# Patient Record
Sex: MALE | HISPANIC OR LATINO | URBAN - METROPOLITAN AREA
[De-identification: names, ages, dates, MRNs, and addresses within clinical notes are randomized per-mention and may not be internally consistent; named-entity substitution may affect disease eponyms.]

---
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
## 2023-01-03 ENCOUNTER — OFFICE VISIT (OUTPATIENT)
Dept: PLASTIC SURGERY | Facility: CLINIC | Age: 18
End: 2023-01-03
Payer: COMMERCIAL

## 2023-01-03 VITALS
BODY MASS INDEX: 25.66 KG/M2 | OXYGEN SATURATION: 99 % | WEIGHT: 154 LBS | HEIGHT: 65 IN | SYSTOLIC BLOOD PRESSURE: 139 MMHG | HEART RATE: 82 BPM | DIASTOLIC BLOOD PRESSURE: 84 MMHG

## 2023-01-03 DIAGNOSIS — F64.0 GENDER DYSPHORIA IN ADULT: Primary | ICD-10-CM

## 2023-01-03 PROCEDURE — 99212 OFFICE O/P EST SF 10 MIN: CPT | Performed by: SURGERY

## 2023-01-03 ASSESSMENT — PAIN SCALES - GENERAL: PAINLEVEL: MODERATE PAIN (4)

## 2023-01-03 NOTE — LETTER
1/3/2023       RE: Virgil Calle  [address] [place] [address]     Dear Colleague,    Thank you for referring your patient, Virgil Calle, to the [facility] PLASTIC AND RECONSTRUCTIVE SURGERY CLINIC [place] at [facility]. Please see a copy of my visit note below.    PLASTICS PRE-OP  This is a 17 year old trans male who presents for his pre-op visit prior to bilateral simple mastectomy with nipple graft reconstruction scheduled for 1/6/2023. He is here today by himself. The patient's letter of support from Chet López has been received. History and physical will be completed today. COVID test will be performed 24-48 hours prior to surgery.     His roommate Mere will be able to take care of him postoperatively. Virgil denies having questions about the procedure.    Our LPN, Nida, discussed periop instructions with the patient including: not eating anything 8 hours prior to surgery, drinking clear liquids up to 2 hours before surgery except for morning medications with a sip of water, the preop shower with surgical soap which was given, and wearing a button- or zip-up shirt on the day of surgery. The patient was also instructed to avoid NSAIDs x 1 week both before AND after surgery, but he may take Tylenol post-op for pain as needed. The patient was provided with a folder of information on antoine-op topics, including where the surgery will be.     I discussed the following with the patient; preop, intraop and postop phases of care on the day of surgery, the placement of a bladder catheter during surgery that will likely be removed in recovery, postop cares and limitations with relation to home and work settings, 5-lb weight restriction for the first 3 weeks postop, and how long to maintain limited activities. We also discussed Zofran, oxycodone, Z-hermelinda, and antipruritics which will be prescribed. We discussed that preventing constipation will be their  responsibility, and we discussed methods such as aloe, prune juice or Miralax.     In addition, we went over the possible risks and complications involved with this elective procedure. These include but are not limited to: infection, bleeding, hematoma/seroma formation, and poor healing (including dehiscence, nipple graft loss, or hypertrophic scarring). We also discussed the possibility of altered chest sensation (either hypo or hypersensitive), residual deformities and asymmetries, possible further surgical revisions, and possible injury to surrounding neurovascular and musculoskeletal structures, including intra-axillary or intra-thoracic. We lastly discussed anesthetic risks including DVT/PE or cardiopulmonary events.      All questions were answered. Virgil will bring any other questions that arise to the day of surgery.    Total time = 10 minutes, spent on the date of encounter doing chart review, history and physical, dressing changes, documentation, patient education, and any further activity as noted above.     This note was prepared on behalf of Jeannie Kelly MD by Mere Hirsch (they/them, a trained medical scribe, based on my observations and the provider's statements to me.         Again, thank you for allowing me to participate in the care of your patient.      Sincerely,    Jeannie Kelly MD

## 2023-01-03 NOTE — NURSING NOTE
"Chief Complaint   Patient presents with     RECHECK     Virgil, is being seen today for a pre-op DOS 1/6/23.       Vitals:    01/03/23 0832   BP: 139/84   BP Location: Left arm   Patient Position: Chair   Cuff Size: Adult Regular   Pulse: 82   SpO2: 99%   Weight: 69.9 kg (154 lb)   Height: 1.651 m (5' 5\")       Body mass index is 25.63 kg/m .      Nida Carnes LPN    "

## 2023-01-03 NOTE — PROGRESS NOTES
PLASTICS PRE-OP  This is a 17 year old trans male who presents for his pre-op visit prior to bilateral simple mastectomy with nipple graft reconstruction scheduled for 1/6/2023. He is here today by himself. The patient's letter of support from Chet López has been received. History and physical will be completed today. COVID test will be performed 24-48 hours prior to surgery.     His roommate Mere will be able to take care of him postoperatively. Virgil denies having questions about the procedure.    Our LPN, Nida, discussed periop instructions with the patient including: not eating anything 8 hours prior to surgery, drinking clear liquids up to 2 hours before surgery except for morning medications with a sip of water, the preop shower with surgical soap which was given, and wearing a button- or zip-up shirt on the day of surgery. The patient was also instructed to avoid NSAIDs x 1 week both before AND after surgery, but he may take Tylenol post-op for pain as needed. The patient was provided with a folder of information on antoine-op topics, including where the surgery will be.     I discussed the following with the patient; preop, intraop and postop phases of care on the day of surgery, the placement of a bladder catheter during surgery that will likely be removed in recovery, postop cares and limitations with relation to home and work settings, 5-lb weight restriction for the first 3 weeks postop, and how long to maintain limited activities. We also discussed Zofran, oxycodone, Z-hermelinda, and antipruritics which will be prescribed. We discussed that preventing constipation will be their responsibility, and we discussed methods such as aloe, prune juice or Miralax.     In addition, we went over the possible risks and complications involved with this elective procedure. These include but are not limited to: infection, bleeding, hematoma/seroma formation, and poor healing (including dehiscence, nipple graft loss, or  hypertrophic scarring). We also discussed the possibility of altered chest sensation (either hypo or hypersensitive), residual deformities and asymmetries, possible further surgical revisions, and possible injury to surrounding neurovascular and musculoskeletal structures, including intra-axillary or intra-thoracic. We lastly discussed anesthetic risks including DVT/PE or cardiopulmonary events.      All questions were answered. Virgil will bring any other questions that arise to the day of surgery.    Total time = 10 minutes, spent on the date of encounter doing chart review, history and physical, dressing changes, documentation, patient education, and any further activity as noted above.     This note was prepared on behalf of Jeannie Kelly MD by Mere Hirsch (they/them, a trained medical scribe, based on my observations and the provider's statements to me.

## 2023-01-05 ENCOUNTER — ANESTHESIA EVENT (OUTPATIENT)
Dept: SURGERY | Facility: AMBULATORY SURGERY CENTER | Age: 18
End: 2023-01-05
Payer: COMMERCIAL

## 2023-01-06 ENCOUNTER — HOSPITAL ENCOUNTER (OUTPATIENT)
Facility: AMBULATORY SURGERY CENTER | Age: 18
Discharge: HOME | End: 2023-01-06
Attending: SURGERY
Payer: COMMERCIAL

## 2023-01-06 ENCOUNTER — ANESTHESIA (OUTPATIENT)
Dept: SURGERY | Facility: AMBULATORY SURGERY CENTER | Age: 18
End: 2023-01-06
Payer: COMMERCIAL

## 2023-01-06 VITALS
RESPIRATION RATE: 14 BRPM | OXYGEN SATURATION: 94 % | HEIGHT: 65 IN | DIASTOLIC BLOOD PRESSURE: 56 MMHG | SYSTOLIC BLOOD PRESSURE: 102 MMHG | HEART RATE: 67 BPM | WEIGHT: 154 LBS | TEMPERATURE: 97.5 F | BODY MASS INDEX: 25.66 KG/M2

## 2023-01-06 DIAGNOSIS — G89.18 POST-OPERATIVE PAIN: Primary | ICD-10-CM

## 2023-01-06 LAB
HCG UR QL: NEGATIVE
INTERNAL QC OK POCT: NORMAL
POCT KIT EXPIRATION DATE: NORMAL
POCT KIT LOT NUMBER: NORMAL

## 2023-01-06 PROCEDURE — 19350 NIPPLE/AREOLA RECONSTRUCTION: CPT | Mod: 50 | Performed by: SURGERY

## 2023-01-06 PROCEDURE — 19303 MAST SIMPLE COMPLETE: CPT | Mod: 50 | Performed by: SURGERY

## 2023-01-06 PROCEDURE — 81025 URINE PREGNANCY TEST: CPT | Performed by: PATHOLOGY

## 2023-01-06 PROCEDURE — 88305 TISSUE EXAM BY PATHOLOGIST: CPT | Mod: 26 | Performed by: PATHOLOGY

## 2023-01-06 PROCEDURE — 88305 TISSUE EXAM BY PATHOLOGIST: CPT | Mod: TC | Performed by: SURGERY

## 2023-01-06 RX ORDER — OXYCODONE HYDROCHLORIDE 5 MG/1
10 TABLET ORAL EVERY 4 HOURS PRN
Status: DISCONTINUED | OUTPATIENT
Start: 2023-01-06 | End: 2023-01-07 | Stop reason: HOSPADM

## 2023-01-06 RX ORDER — ONDANSETRON 2 MG/ML
INJECTION INTRAMUSCULAR; INTRAVENOUS PRN
Status: DISCONTINUED | OUTPATIENT
Start: 2023-01-06 | End: 2023-01-06

## 2023-01-06 RX ORDER — LIDOCAINE HYDROCHLORIDE 20 MG/ML
INJECTION, SOLUTION INFILTRATION; PERINEURAL PRN
Status: DISCONTINUED | OUTPATIENT
Start: 2023-01-06 | End: 2023-01-06

## 2023-01-06 RX ORDER — FENTANYL CITRATE 50 UG/ML
50 INJECTION, SOLUTION INTRAMUSCULAR; INTRAVENOUS EVERY 5 MIN PRN
Status: DISCONTINUED | OUTPATIENT
Start: 2023-01-06 | End: 2023-01-07 | Stop reason: HOSPADM

## 2023-01-06 RX ORDER — ONDANSETRON 2 MG/ML
4 INJECTION INTRAMUSCULAR; INTRAVENOUS EVERY 30 MIN PRN
Status: DISCONTINUED | OUTPATIENT
Start: 2023-01-06 | End: 2023-01-07 | Stop reason: HOSPADM

## 2023-01-06 RX ORDER — OXYCODONE HYDROCHLORIDE 5 MG/1
5 TABLET ORAL EVERY 6 HOURS PRN
Qty: 12 TABLET | Refills: 0 | Status: SHIPPED | OUTPATIENT
Start: 2023-01-06 | End: 2023-01-10

## 2023-01-06 RX ORDER — DEXMEDETOMIDINE HYDROCHLORIDE 4 UG/ML
INJECTION, SOLUTION INTRAVENOUS CONTINUOUS PRN
Status: DISCONTINUED | OUTPATIENT
Start: 2023-01-06 | End: 2023-01-06

## 2023-01-06 RX ORDER — FENTANYL CITRATE 50 UG/ML
25 INJECTION, SOLUTION INTRAMUSCULAR; INTRAVENOUS
Status: DISCONTINUED | OUTPATIENT
Start: 2023-01-06 | End: 2023-01-07 | Stop reason: HOSPADM

## 2023-01-06 RX ORDER — OXYCODONE HYDROCHLORIDE 5 MG/1
5 TABLET ORAL EVERY 4 HOURS PRN
Status: DISCONTINUED | OUTPATIENT
Start: 2023-01-06 | End: 2023-01-07 | Stop reason: HOSPADM

## 2023-01-06 RX ORDER — MEPERIDINE HYDROCHLORIDE 25 MG/ML
12.5 INJECTION INTRAMUSCULAR; INTRAVENOUS; SUBCUTANEOUS
Status: DISCONTINUED | OUTPATIENT
Start: 2023-01-06 | End: 2023-01-07 | Stop reason: HOSPADM

## 2023-01-06 RX ORDER — HYDROMORPHONE HYDROCHLORIDE 1 MG/ML
0.2 INJECTION, SOLUTION INTRAMUSCULAR; INTRAVENOUS; SUBCUTANEOUS EVERY 5 MIN PRN
Status: DISCONTINUED | OUTPATIENT
Start: 2023-01-06 | End: 2023-01-07 | Stop reason: HOSPADM

## 2023-01-06 RX ORDER — PROPOFOL 10 MG/ML
INJECTION, EMULSION INTRAVENOUS PRN
Status: DISCONTINUED | OUTPATIENT
Start: 2023-01-06 | End: 2023-01-06

## 2023-01-06 RX ORDER — LIDOCAINE 40 MG/G
CREAM TOPICAL
Status: DISCONTINUED | OUTPATIENT
Start: 2023-01-06 | End: 2023-01-07 | Stop reason: HOSPADM

## 2023-01-06 RX ORDER — EPHEDRINE SULFATE 50 MG/ML
INJECTION, SOLUTION INTRAMUSCULAR; INTRAVENOUS; SUBCUTANEOUS PRN
Status: DISCONTINUED | OUTPATIENT
Start: 2023-01-06 | End: 2023-01-06

## 2023-01-06 RX ORDER — PROPOFOL 10 MG/ML
INJECTION, EMULSION INTRAVENOUS CONTINUOUS PRN
Status: DISCONTINUED | OUTPATIENT
Start: 2023-01-06 | End: 2023-01-06

## 2023-01-06 RX ORDER — ONDANSETRON 4 MG/1
4 TABLET, ORALLY DISINTEGRATING ORAL EVERY 30 MIN PRN
Status: DISCONTINUED | OUTPATIENT
Start: 2023-01-06 | End: 2023-01-07 | Stop reason: HOSPADM

## 2023-01-06 RX ORDER — FENTANYL CITRATE 50 UG/ML
INJECTION, SOLUTION INTRAMUSCULAR; INTRAVENOUS PRN
Status: DISCONTINUED | OUTPATIENT
Start: 2023-01-06 | End: 2023-01-06

## 2023-01-06 RX ORDER — CEFAZOLIN SODIUM 2 G/50ML
2 SOLUTION INTRAVENOUS SEE ADMIN INSTRUCTIONS
Status: DISCONTINUED | OUTPATIENT
Start: 2023-01-06 | End: 2023-01-07 | Stop reason: HOSPADM

## 2023-01-06 RX ORDER — HYDROMORPHONE HYDROCHLORIDE 1 MG/ML
0.4 INJECTION, SOLUTION INTRAMUSCULAR; INTRAVENOUS; SUBCUTANEOUS EVERY 5 MIN PRN
Status: DISCONTINUED | OUTPATIENT
Start: 2023-01-06 | End: 2023-01-07 | Stop reason: HOSPADM

## 2023-01-06 RX ORDER — KETOROLAC TROMETHAMINE 30 MG/ML
INJECTION, SOLUTION INTRAMUSCULAR; INTRAVENOUS PRN
Status: DISCONTINUED | OUTPATIENT
Start: 2023-01-06 | End: 2023-01-06

## 2023-01-06 RX ORDER — SODIUM CHLORIDE, SODIUM LACTATE, POTASSIUM CHLORIDE, CALCIUM CHLORIDE 600; 310; 30; 20 MG/100ML; MG/100ML; MG/100ML; MG/100ML
INJECTION, SOLUTION INTRAVENOUS CONTINUOUS
Status: DISCONTINUED | OUTPATIENT
Start: 2023-01-06 | End: 2023-01-07 | Stop reason: HOSPADM

## 2023-01-06 RX ORDER — ONDANSETRON 4 MG/1
4 TABLET, FILM COATED ORAL EVERY 8 HOURS PRN
Qty: 12 TABLET | Refills: 0 | Status: SHIPPED | OUTPATIENT
Start: 2023-01-06

## 2023-01-06 RX ORDER — HYDROXYZINE HYDROCHLORIDE 25 MG/1
25 TABLET, FILM COATED ORAL 3 TIMES DAILY PRN
Qty: 12 TABLET | Refills: 0 | Status: SHIPPED | OUTPATIENT
Start: 2023-01-06 | End: 2023-01-16

## 2023-01-06 RX ORDER — SCOLOPAMINE TRANSDERMAL SYSTEM 1 MG/1
1 PATCH, EXTENDED RELEASE TRANSDERMAL ONCE
Status: DISCONTINUED | OUTPATIENT
Start: 2023-01-06 | End: 2023-01-07 | Stop reason: HOSPADM

## 2023-01-06 RX ORDER — ACETAMINOPHEN 325 MG/1
975 TABLET ORAL ONCE
Status: COMPLETED | OUTPATIENT
Start: 2023-01-06 | End: 2023-01-06

## 2023-01-06 RX ORDER — FENTANYL CITRATE 50 UG/ML
25 INJECTION, SOLUTION INTRAMUSCULAR; INTRAVENOUS EVERY 5 MIN PRN
Status: DISCONTINUED | OUTPATIENT
Start: 2023-01-06 | End: 2023-01-07 | Stop reason: HOSPADM

## 2023-01-06 RX ORDER — AZITHROMYCIN 250 MG/1
TABLET, FILM COATED ORAL
Qty: 6 TABLET | Refills: 0 | Status: SHIPPED | OUTPATIENT
Start: 2023-01-06 | End: 2023-01-11

## 2023-01-06 RX ORDER — BUPIVACAINE HYDROCHLORIDE 2.5 MG/ML
INJECTION, SOLUTION INFILTRATION; PERINEURAL PRN
Status: DISCONTINUED | OUTPATIENT
Start: 2023-01-06 | End: 2023-01-06 | Stop reason: HOSPADM

## 2023-01-06 RX ORDER — CEFAZOLIN SODIUM 2 G/50ML
2 SOLUTION INTRAVENOUS
Status: COMPLETED | OUTPATIENT
Start: 2023-01-06 | End: 2023-01-06

## 2023-01-06 RX ORDER — DEXAMETHASONE SODIUM PHOSPHATE 4 MG/ML
INJECTION, SOLUTION INTRA-ARTICULAR; INTRALESIONAL; INTRAMUSCULAR; INTRAVENOUS; SOFT TISSUE PRN
Status: DISCONTINUED | OUTPATIENT
Start: 2023-01-06 | End: 2023-01-06

## 2023-01-06 RX ADMIN — Medication 0.5 MG: at 12:27

## 2023-01-06 RX ADMIN — SCOLOPAMINE TRANSDERMAL SYSTEM 1 PATCH: 1 PATCH, EXTENDED RELEASE TRANSDERMAL at 17:37

## 2023-01-06 RX ADMIN — DEXMEDETOMIDINE HYDROCHLORIDE 0.5 MCG/KG/HR: 4 INJECTION, SOLUTION INTRAVENOUS at 11:41

## 2023-01-06 RX ADMIN — EPHEDRINE SULFATE 5 MG: 50 INJECTION, SOLUTION INTRAMUSCULAR; INTRAVENOUS; SUBCUTANEOUS at 13:05

## 2023-01-06 RX ADMIN — CEFAZOLIN SODIUM 2 G: 2 SOLUTION INTRAVENOUS at 11:47

## 2023-01-06 RX ADMIN — KETOROLAC TROMETHAMINE 30 MG: 30 INJECTION, SOLUTION INTRAMUSCULAR; INTRAVENOUS at 15:35

## 2023-01-06 RX ADMIN — SODIUM CHLORIDE, SODIUM LACTATE, POTASSIUM CHLORIDE, CALCIUM CHLORIDE: 600; 310; 30; 20 INJECTION, SOLUTION INTRAVENOUS at 11:37

## 2023-01-06 RX ADMIN — FENTANYL CITRATE 50 MCG: 50 INJECTION, SOLUTION INTRAMUSCULAR; INTRAVENOUS at 11:45

## 2023-01-06 RX ADMIN — SODIUM CHLORIDE, SODIUM LACTATE, POTASSIUM CHLORIDE, CALCIUM CHLORIDE: 600; 310; 30; 20 INJECTION, SOLUTION INTRAVENOUS at 13:36

## 2023-01-06 RX ADMIN — EPHEDRINE SULFATE 5 MG: 50 INJECTION, SOLUTION INTRAMUSCULAR; INTRAVENOUS; SUBCUTANEOUS at 12:56

## 2023-01-06 RX ADMIN — FENTANYL CITRATE 25 MCG: 50 INJECTION, SOLUTION INTRAMUSCULAR; INTRAVENOUS at 16:24

## 2023-01-06 RX ADMIN — FENTANYL CITRATE 25 MCG: 50 INJECTION, SOLUTION INTRAMUSCULAR; INTRAVENOUS at 16:17

## 2023-01-06 RX ADMIN — PROPOFOL 200 MG: 10 INJECTION, EMULSION INTRAVENOUS at 11:42

## 2023-01-06 RX ADMIN — DEXAMETHASONE SODIUM PHOSPHATE 4 MG: 4 INJECTION, SOLUTION INTRA-ARTICULAR; INTRALESIONAL; INTRAMUSCULAR; INTRAVENOUS; SOFT TISSUE at 12:12

## 2023-01-06 RX ADMIN — LIDOCAINE HYDROCHLORIDE 100 MG: 20 INJECTION, SOLUTION INFILTRATION; PERINEURAL at 11:41

## 2023-01-06 RX ADMIN — ONDANSETRON 4 MG: 2 INJECTION INTRAMUSCULAR; INTRAVENOUS at 15:35

## 2023-01-06 RX ADMIN — ACETAMINOPHEN 975 MG: 325 TABLET ORAL at 11:02

## 2023-01-06 RX ADMIN — PROPOFOL 200 MCG/KG/MIN: 10 INJECTION, EMULSION INTRAVENOUS at 11:41

## 2023-01-06 RX ADMIN — OXYCODONE HYDROCHLORIDE 5 MG: 5 TABLET ORAL at 16:19

## 2023-01-06 NOTE — ANESTHESIA PREPROCEDURE EVALUATION
Anesthesia Pre-Procedure Evaluation    Patient: Virgil Calle   MRN: [id number] : 2005        Procedure : Procedure(s):  MASTECTOMY, BILATERAL, SIMPLE, possible nipple grafts. OnQ          Past Medical History:   Diagnosis Date     Anxiety      Autism      Depression      Fibromyalgia      Gender dysphoria      OCD (obsessive compulsive disorder)      PTSD (post-traumatic stress disorder)       History reviewed. No pertinent surgical history.   Allergies   Allergen Reactions     Casein Anaphylaxis     Lac Bovis Anaphylaxis     Milk Protein Extract Anaphylaxis     Sertraline Other (See Comments)     Increased anxiety and nausea  Increased anxiety and nausea  Increased anxiety and nausea        Social History     Tobacco Use     Smoking status: Never     Smokeless tobacco: Never   Substance Use Topics     Alcohol use: Never      Wt Readings from Last 1 Encounters:   23 69.9 kg (154 lb) (83 %, Z= 0.96)*     * Growth percentiles are based on CDC (Girls, 2-20 Years) data.        Anesthesia Evaluation            ROS/MED HX  ENT/Pulmonary:  - neg pulmonary ROS     Neurologic:  - neg neurologic ROS     Cardiovascular:  - neg cardiovascular ROS     METS/Exercise Tolerance:     Hematologic:  - neg hematologic  ROS     Musculoskeletal:  - neg musculoskeletal ROS     GI/Hepatic:  - neg GI/hepatic ROS     Renal/Genitourinary:  - neg Renal ROS     Endo:  - neg endo ROS     Psychiatric/Substance Use:     (+) psychiatric history anxiety, depression and other (comment)     Infectious Disease:  - neg infectious disease ROS     Malignancy:  - neg malignancy ROS     Other:               OUTSIDE LABS:  CBC: No results found for: WBC, HGB, HCT, PLT  BMP: No results found for: NA, POTASSIUM, CHLORIDE, CO2, BUN, CR, GLC  COAGS: No results found for: PTT, INR, FIBR  POC:   Lab Results   Component Value Date    HCG Negative 2023     HEPATIC: No results found for: ALBUMIN, PROTTOTAL, ALT, AST, GGT, ALKPHOS, BILITOTAL,  BILIDIRECT, SHARON  OTHER: No results found for: PH, LACT, A1C, HEIDY, PHOS, MAG, LIPASE, AMYLASE, TSH, T4, T3, CRP, SED    Anesthesia Plan    ASA Status:  1      Anesthesia Type: General.     - Airway: LMA              Consents    Anesthesia Plan(s) and associated risks, benefits, and realistic alternatives discussed. Questions answered and patient/representative(s) expressed understanding.     - Discussed: Risks, Benefits and Alternatives for BOTH SEDATION and the PROCEDURE were discussed     - Discussed with:  Patient      - Extended Intubation/Ventilatory Support Discussed: No.      - Patient is DNR/DNI Status: No    Use of blood products discussed: No .     Postoperative Care    Pain management: IV analgesics, Oral pain medications.   PONV prophylaxis: Ondansetron (or other 5HT-3), Dexamethasone or Solumedrol     Comments:                Severino Casanova MD, MD   How Severe Are Your Spot(S)?: mild What Is The Reason For Today's Visit?: Full Body Skin Examination What Is The Reason For Today's Visit? (Being Monitored For X): the development of new lesions

## 2023-01-06 NOTE — ANESTHESIA CARE TRANSFER NOTE
Patient: Virgil Calle    Procedure: Procedure(s):  MASTECTOMY, BILATERAL, SIMPLE, nipple grafts, OnQ       Diagnosis: Gender dysphoria in adolescent and adult [F64.0]  Diagnosis Additional Information: No value filed.    Anesthesia Type:   General     Note:    Oropharynx: oropharynx clear of all foreign objects and spontaneously breathing  Level of Consciousness: awake  Oxygen Supplementation: face mask  Level of Supplemental Oxygen (L/min / FiO2): 8  Independent Airway: airway patency satisfactory and stable  Dentition: dentition unchanged  Vital Signs Stable: post-procedure vital signs reviewed and stable  Report to RN Given: handoff report given  Patient transferred to: PACU    Handoff Report: Identifed the Patient, Identified the Reponsible Provider, Reviewed the pertinent medical history, Discussed the surgical course, Reviewed Intra-OP anesthesia mangement and issues during anesthesia, Set expectations for post-procedure period and Allowed opportunity for questions and acknowledgement of understanding      Vitals:  Vitals Value Taken Time   /64 01/06/23 1559   Temp 98.1    Pulse 73 01/06/23 1602   Resp 27 01/06/23 1602   SpO2 97 % 01/06/23 1602   Vitals shown include unvalidated device data.    Electronically Signed By: GILBERTO FOWLER  January 6, 2023  4:03 PM

## 2023-01-06 NOTE — ANESTHESIA POSTPROCEDURE EVALUATION
Patient: Virgil Calle    Procedure: Procedure(s):  MASTECTOMY, BILATERAL, SIMPLE, nipple grafts, OnQ       Anesthesia Type:  General    Note:  Disposition: Outpatient   Postop Pain Control: Uneventful            Sign Out: Well controlled pain   PONV: No   Neuro/Psych: Uneventful            Sign Out: Acceptable/Baseline neuro status   Airway/Respiratory: Uneventful            Sign Out: Acceptable/Baseline resp. status   CV/Hemodynamics: Uneventful            Sign Out: Acceptable CV status; No obvious hypovolemia; No obvious fluid overload   Other NRE: NONE   DID A NON-ROUTINE EVENT OCCUR? No           Last vitals:  Vitals Value Taken Time   /64 01/06/23 1600   Temp 36.1  C (97  F) 01/06/23 1559   Pulse 66 01/06/23 1607   Resp 14 01/06/23 1607   SpO2 98 % 01/06/23 1607   Vitals shown include unvalidated device data.    Electronically Signed By: Severino Casanova MD, MD  January 6, 2023  4:08 PM

## 2023-01-06 NOTE — DISCHARGE INSTRUCTIONS
Scopolamine Patch- (Absorbed through the skin)    This medicine prevents nausea and vomiting caused by motion sickness or anesthesia.  The medicine is in a patch worn behind the ear.      Do NOT use the Scopolamine Patch if you have glaucoma or are allergic to scopolamine.    How to Use This Medicine:  The patch is applied behind the ear.  Keep the patch dry to prevent it from falling off.  Limit contact with water (no bathing or swimming).    If the patch is loose or falls off throw it away.  You do not need to apply a new patch.  After you take off the patch or if it falls off, wash your hands and the area behind your ear with soap and water.    You can remove the patch tomorrow, or leave on for up to 3 days.  Only one patch should be used at any time.    How to Dispose of This Medicine:  Fold the used patch in half with the sticky sides together. Throw any used patch away so that children or pets cannot get to it. You will also need to throw away old patches after the expiration date has passed.  Keep all medicine away from children and never share your medicine with anyone.    Warnings While Using This Medicine:  This medicine can make you sleepy.  Avoid taking sleeping pills and other medicines that can make you sleepy while the patch is on.  Do not drink alcohol while the patch is on.  This medicine can cause temporary blurring and other vision problems if it comes in contact with the eyes.  This is not serious unless accompanied by eye pain and redness.   This medicine may cause problems with urination. If you have problems with urinating, remove the patch.  If you are unable to urinate, call your doctor.    This medicine may make you dizzy or drowsy. Avoid driving, using machines, or doing anything else that could be dangerous if the patch is on.  This medicine may make you sweat less and cause your body to get too hot. Be careful in hot weather or if you are exercising.  Make sure any doctor or dentist who  treats you knows that you have the patch on. This medicine may affect the results of certain medical tests.  Skin burns have been reported at the patch site in several patients wearing an aluminized transdermal system during a magnetic resonance imaging scan (MRI).  Since this patch contains aluminum, it is recommended to remove the patch if you are having an MRI.    Possible Side Effects While Using This Medicine:  Dry mouth  Drowsiness  Temporary blurring of vision and widening of the pupils    Call your doctor right away if you notice any of these side effects:  Allergic reaction: Itching or hives, swelling in your face or hands, swelling or tingling in your mouth or throat, chest tightness, trouble breathing.  Blurred vision that does not go away after the patch is removed  Confusion or memory loss  Fast,slow, or uneven heartbeat  Lightheadedness, dizziness, drowsiness, or fainting  Seeing, hearing, or feeling things that are not there  Restlessness  Severe eye pain  Trouble urinating    If you notice other side effects that you think are caused by this medicine, call your doctor immediately.           Caring for Your Siva-Bonilla Drain    You have been discharged with a Siva-Bonilla drainage tube. This tube drains fluid from your incision, helping prevent swelling and reducing the risk for infection. The tube is held in place by a few stitches. The drain will be removed when your doctor determines you no longer need it and when the amount of drainage decreases. The color and amount of fluid varies. Right after surgery, the fluid may be bright red and may become clearer over time.   Dressing:  Keep the skin around the tube dry.  If you have a dressing, change it every day.   Wash your hands.  Remove the old bandage. Do not use scissors-you may accidentally cut the tube.  Check for any redness, swelling, drainage, or broken stitches. (Call your doctor with any of these findings).  Wash your hands again.  Wet a  cotton swab (Q-tip) and clean around the incision and the tube site. Use normal saline solution (salt and water) or soap and water. Start at the tube site and move outward in a circular motion.   Pat dry.  Put a new bandage on the incision and tube site. Make the bandage large enough to cover the whole incision area.   Tape the bandage in place.  Throw out old materials and wash your hands.   Home Care:  Tape the tube to the skin below the bandage. Make sure to keep some slack in the tube to keep it from pulling out.   DO NOT sleep on the same side as the tube.  Secure the tube and bulb inside your clothing with a safety pin. This helps keep the tube from being pulled out.   Keep the bulb compressed at all times, except when you empty it.  Empty your drain at least twice a day. Empty it more often if the drain is full.   Lift the opening of the drain.  Drain the fluid into a measuring cup.  Record the amount of fluid each time you empty. Share the information with your doctor at your follow-up visit.   Squeeze the bulb with your hands until you hear air coming out of the bulb.  Close the opening.   Tape plastic wrap over the bandage and tube site when you shower.    Stripping  the tube helps keep blood clots from blocking the tube.                         ONLY DO THIS IF YOUR DOCTOR INSTRUCTED YOU TO DO SO!  Hold the tubing where it leaves the skin with one hand. This keeps it from pulling on the skin.  Pinch the tubing with the thumb and first finger of your other hand.   Slowly and firmly pull your thumb and first finger down the tube (squeezing the tube between your fingers). Keep squeezing the tube as you run your fingers towards the bulb. If the pulling hurts or feels like it is coming out of the skin, STOP. Begin again more gently.  Let go of the tubing with both hands. If the tube is still blocked, repeat these steps three or four times. Make sure that the bulb is compressed so it creates suction.  When to  call your doctor:  New or increased pain around the tube  Redness, warmth, or swelling around the incision or tube  Drainage that is foul smelling  Vomiting  Fever over 101 F degrees  Fluid leaking around the tube  Incision seems not to be healing  Stitches become loose  The tube falls out  Drainage that changes from light pick to dark red  A sudden increase or decrease in the amount of drainage (over 30 ml).  Your drainage record:  Date Time Bulb 1: Amount of drainage (ml or cc) Bulb 2: Amount of drainage (ml or cc) [facility]  Home Care Following Anesthesia  For 24 hours after surgery:  Get plenty of rest.  A responsible adult must stay with you for at least 24 hours after you leave the surgery center.  Do not drive or use heavy equipment.  If you have weakness or tingling, don't drive or use heavy equipment until this feeling goes away.   Do not drink alcohol.   Avoid strenuous or risky activities.  Ask for help when climbing stairs.  You may feel lightheaded.  IF so, sit for a few minutes before standing.  Have someone help you get up.   If you have nausea (feel sick to your stomach): Drink only clear liquids such as apple juice, ginger ale, broth or 7-Up.  Rest may also help.  Be sure to drink enough fluids.  Move to a regular diet as you feel able.   You may have a slight fever.  Call the doctor if your fever is over 100 F (37.7 C) (taken under the tongue) or lasts longer than 24 hours.  You may have a dry mouth, a sore throat, muscle aches or trouble sleeping. These should go away after 24 hours.  Do not make important or legal decisions.   It is recommended to avoid smoking.               Tips for taking pain medications  To get the best pain relief possible, remember these points:  Take pain medications as directed, before pain becomes severe.  Pain medication can upset  your stomach: taking it with food may help.  Constipation is a common side effect of pain medication. Drink plenty of  fluids.  Eat foods high in fiber. Take a stool softener if recommended by your doctor or pharmacist.  Do not drink alcohol, drive or operate machinery while taking pain medications.  Ask about other ways to control pain, such as with heat, ice or relaxation.    Tylenol/Acetaminophen Consumption  To help encourage the safe use of acetaminophen, the makers of TYLENOL  have lowered the maximum daily dose for single-ingredient Extra Strength TYLENOL  (acetaminophen) products sold in the [place] from 8 pills per day (4,000 mg) to 6 pills per day (3,000 mg). The dosing interval has also changed from 2 pills every 4-6 hours to 2 pills every 6 hours.  If you feel your pain relief is insufficient, you may take Tylenol/Acetaminophen in addition to your narcotic pain medication.   Be careful not to exceed 3,000 mg of Tylenol/Acetaminophen in a 24 hour period from all sources.  If you are taking extra strength Tylenol/acetaminophen (500 mg), the maximum dose is 6 tablets in 24 hours.  If you are taking regular strength acetaminophen (325 mg), the maximum dose is 9 tablets in 24 hours.  975 mg of tylenol received at 11 AM, next dose available at 5 PM- then just follow the package instructions    Call a doctor for any of the following:  Signs of infection (fever, growing tenderness at the surgery site, a large amount of drainage or bleeding, severe pain, foul-smelling drainage, redness, swelling).  It has been over 8 to 10 hours since surgery and you are still not able to urinate (pass water).  Headache for over 24 hours.  Signs of Covid-19 infection (temperature over 100 degrees, shortness of breath, cough, loss of taste/smell, generalized body aches, persistent headache, chills, sore throat, nausea/vomiting/diarrhea)  Your doctor is:  Dr. Jeannie Kelly, Plastic Surgery: [phone]                 Or dial  [phone] and ask for the resident on call for:  Plastics  For emergency care, call the:  [facility]:  [phone] (TTY for hearing impaired: [phone])

## 2023-01-08 ENCOUNTER — TELEPHONE (OUTPATIENT)
Dept: SURGERY | Facility: CLINIC | Age: 18
End: 2023-01-08

## 2023-01-08 NOTE — TELEPHONE ENCOUNTER
Plastic Surgery Telephone Encounter    I was paged with concerns for left breast pain post op. Patient is POD2 s/p bilateral gender affirming mastectomies with free nipple grafts w/ Dr. Kelly on 1/6. He reports he has been having pain over the left chest/nipple each time he strips the drains. Has noted more drainage from the left drain. Reports there was 7 ml of fluid from the right drain this morning after waking up, and 13 ml of fluid from the left drain, and the left drain appears more red than the right. Reports yesterday he fell to the ground due to feeling lightheaded. Today denies feeling like he is going to pass out. Has been ambulating ok but needs to take it slow. Denies significant asymmetric swelling or firmness of the left chest compared to the right. Has been tolerating po and fluids ok. Some nausea immediately after taking oxy.     I advised him that the things to be concerned about are hematoma and fluid status. Given his chest is symmetric and drain outputs are within normal range b/l, I am less concerned about hematoma but advised him on symptoms to look out for. I am concerned his fluid status is down given he fell to the floor yesterday and felt lightheaded, but am reassured that he was feeling better this morning. I encouraged him to take a lot of fluids by mouth to keep hydrated and eat well and take it slow when getting up to ambulate. I advised him that if he were to pass out, he should go to his local ED to get evaluated and have his vitals checks. With regards to his pain to the left chest with stripping the drain, I encouraged him to take a small dose oxy with vistaril about 30 minutes prior to stripping the drain and see if that helps. I suspect the drain may be irritating his skin and nipple when he is manipulating the drain. He has about 6 tabs of oxy left, if he were to need more, I encouraged him to call the plastic surgery clinic tomorrow morning to have some sent electronically as  I am unable to send this for him without a OLEG. He confirmed understanding and will call again if he has any further questions or concerns.    Loni Castañeda MD  Plastic Surgery PGY4

## 2023-01-09 ENCOUNTER — TELEPHONE (OUTPATIENT)
Dept: PLASTIC SURGERY | Facility: CLINIC | Age: 18
End: 2023-01-09

## 2023-01-09 NOTE — TELEPHONE ENCOUNTER
Called pt to follow up after receiving [employer] messages regarding pain with emptying L drain. Pt spoke with on-call provider about this yesterday. Pt shared that since then he has not stripped the tubing before emptying the left drain and this has helped. Drain output yesterday on left side 20 mL, right 8 mL. Discussed stripping the tubing if there are any clots present in the tubing. Pt shared that the discomfort has improved some since he has tried avoiding manipulating the tubing, but the left side in general is more bothersome than the right. Pt will call if any additional concerns arise, has postop appointment on 1/13 for assessment and drain removal.    Bo Hilario RN

## 2023-01-10 ENCOUNTER — TELEPHONE (OUTPATIENT)
Dept: PLASTIC SURGERY | Facility: CLINIC | Age: 18
End: 2023-01-10

## 2023-01-10 DIAGNOSIS — G89.18 POST-OPERATIVE PAIN: ICD-10-CM

## 2023-01-10 LAB
PATH REPORT.COMMENTS IMP SPEC: NORMAL
PATH REPORT.COMMENTS IMP SPEC: NORMAL
PATH REPORT.FINAL DX SPEC: NORMAL
PATH REPORT.GROSS SPEC: NORMAL
PATH REPORT.MICROSCOPIC SPEC OTHER STN: NORMAL
PATH REPORT.RELEVANT HX SPEC: NORMAL
PHOTO IMAGE: NORMAL

## 2023-01-10 RX ORDER — OXYCODONE HYDROCHLORIDE 5 MG/1
5 TABLET ORAL EVERY 6 HOURS PRN
Qty: 12 TABLET | Refills: 0 | Status: SHIPPED | OUTPATIENT
Start: 2023-01-10

## 2023-01-10 NOTE — TELEPHONE ENCOUNTER
Pt had gender affirming top surgery on 1/6/23 with Dr Kelly. Pt continues to have pain but is out of oxycodone. Pt is also alternating tylenol/ibuprofen but continues to have breakthrough pain. Will discuss with Dr Kelly's team and follow up with pt. Preferred pharmacy is [facility] in [place], [place].     Bo Hilario RN

## 2023-01-12 NOTE — PROGRESS NOTES
Plastic Surgery Outpatient Visit    ID: Virgil Calle is a 17 year old male s/p bilateral mastectomy with nipple grafts for gender dysphoria 1/6/2023 with Dr. Kelly. Required refill of oxy.     S: Doing well, no longer needing oxycodone. Drain output R drain minimal, 0-14cc, L drain 8-15cc. R chest with bruising. No significant pain.     O:  /77   Pulse 117   Temp 98.1  F (36.7  C) (Oral)   SpO2 98%    General: NAD,  Chest: R chest with moderate ecchymosis to medial and superior lateral chest. Incision c/d/i. Nipple graft in place but with small hematoma to superior lateral aspect. L chest incision c/d/i with nipple well adhered.     PATH:  A. LEFT BREAST, SIMPLE MASTECTOMY:  -Benign skin and breast tissue, negative for atypia or malignancy.     B. RIGHT BREAST, SIMPLE MASTECTOMY:  -Benign skin and breast tissue, negative for atypia or malignancy.    A/P:  -healing but with R chest small stable hematoma and small amount of blood under R nipple graft.   -R nipple graft pie crusted graft to release blood     -drains removed today  -nipple dressings x1 week  -wrap x1 week  - tape off in 2 weeks.  -Trex/5lb lifting restrictions x2 more weeks  -monitor for issues, call/message with any concerns. Anticipate longer healing course for R chest resorbing hematoma.   -RTC 6 weeks with Dr. Robin Rouse PA-C  Plastic and Reconstructive Surgery    20 minutes spent on the date of the encounter doing chart review, history and physical, dressing changes, documentation and further activity as noted above.

## 2023-01-13 ENCOUNTER — OFFICE VISIT (OUTPATIENT)
Dept: PLASTIC SURGERY | Facility: CLINIC | Age: 18
End: 2023-01-13
Payer: COMMERCIAL

## 2023-01-13 VITALS
DIASTOLIC BLOOD PRESSURE: 77 MMHG | TEMPERATURE: 98.1 F | HEART RATE: 117 BPM | OXYGEN SATURATION: 98 % | SYSTOLIC BLOOD PRESSURE: 130 MMHG

## 2023-01-13 DIAGNOSIS — F64.0 GENDER DYSPHORIA IN ADULT: Primary | ICD-10-CM

## 2023-01-13 PROCEDURE — 99024 POSTOP FOLLOW-UP VISIT: CPT | Performed by: PHYSICIAN ASSISTANT

## 2023-01-13 ASSESSMENT — PAIN SCALES - GENERAL: PAINLEVEL: MILD PAIN (2)

## 2023-01-13 NOTE — NURSING NOTE
Chief Complaint   Patient presents with     Surgical Followup     1 week post-op -- 1/6/23       Vitals:    01/13/23 0809   BP: 130/77   Pulse: 117   Temp: 98.1  F (36.7  C)   TempSrc: Oral   SpO2: 98%       There is no height or weight on file to calculate BMI.          EMERSON GOINS EMT

## 2023-01-13 NOTE — PATIENT INSTRUCTIONS
Care of Nipples and Chest Incisions    Change nipple dressings once daily for 1 week. Shower with dressings in place for 1 week. After that, ok to remove dressings prior to showering. No DIRECT shower spray on nipple grafts for 4 weeks from your surgery date but water running over incisions and grafts are ok.      Nipple graft dressing changes: Cut vaseline gauze to nipple size and place over nipple. Place folded white gauze over vaseline gauze and cover with plastic dressing. Do dressing changes for 1 week from your post op visit. You may then apply a thin layer of Aquaphor to the nipples until healed.     Keep compression on for 1 more week from today. Continue modified T-Derrick arms for 2 more weeks. At 3 weeks post op you may begin to use your arms as you normally would. Remove the tape from your incisions by 3 weeks post op. You may start moisturizing your incisions with any non-scented, non-glittered lotion 3 weeks from your surgery date. You can start scar care after the tape is removed. Any over the counter scar care is ok, we recommend silicone strips or sheets. These can be purchased on [employer] and at [employer].     At one month post op, baseline shoulder motion should return. Full shoulder motion should return by 8 weeks.      When to call:  Sudden increase of swelling or pain on one side  Uncontrolled pain despite pain medication  Worsening of chest swelling   Separation of nipple from chest skin  Redness and warmth in chest area  Fever > 101     Contact the RN between 8-3:30 Mon-Fri with questions or concerns through my chart message via your doctor's name (most efficient) or call at [phone].   For urgent medical issues that cannot wait, call [phone] Mon-Fri 8:-4:30.     After hours, weekends or holidays, call [phone] and ask to speak to the on call plastic surgeon fellow.

## 2023-01-13 NOTE — LETTER
1/13/2023       RE: Virgil Calle  [address]  [place] [address]     Dear Colleague,    Thank you for referring your patient, Virgil Calle, to the [facility] PLASTIC AND RECONSTRUCTIVE SURGERY CLINIC [place] at [facility]. Please see a copy of my visit note below.    Plastic Surgery Outpatient Visit    ID: Virgil Calle is a 17 year old male s/p bilateral mastectomy with nipple grafts for gender dysphoria 1/6/2023 with Dr. Kelly. Required refill of oxy.     S: Doing well, no longer needing oxycodone. Drain output R drain minimal, 0-14cc, L drain 8-15cc. R chest with bruising. No significant pain.     O:  /77   Pulse 117   Temp 98.1  F (36.7  C) (Oral)   SpO2 98%    General: NAD,  Chest: R chest with moderate ecchymosis to medial and superior lateral chest. Incision c/d/i. Nipple graft in place but with small hematoma to superior lateral aspect. L chest incision c/d/i with nipple well adhered.     PATH:  A. LEFT BREAST, SIMPLE MASTECTOMY:  -Benign skin and breast tissue, negative for atypia or malignancy.     B. RIGHT BREAST, SIMPLE MASTECTOMY:  -Benign skin and breast tissue, negative for atypia or malignancy.    A/P:  -healing but with R chest small stable hematoma and small amount of blood under R nipple graft.   -R nipple graft pie crusted graft to release blood     -drains removed today  -nipple dressings x1 week  -wrap x1 week  - tape off in 2 weeks.  -Trex/5lb lifting restrictions x2 more weeks  -monitor for issues, call/message with any concerns. Anticipate longer healing course for R chest resorbing hematoma.   -RTC 6 weeks with Dr. Robin Rouse PA-C  Plastic and Reconstructive Surgery    20 minutes spent on the date of the encounter doing chart review, history and physical, dressing changes, documentation and further activity as noted above.

## 2023-01-16 DIAGNOSIS — G89.18 POST-OPERATIVE PAIN: ICD-10-CM

## 2023-01-16 RX ORDER — HYDROXYZINE HYDROCHLORIDE 25 MG/1
25 TABLET, FILM COATED ORAL 3 TIMES DAILY PRN
Qty: 12 TABLET | Refills: 0 | Status: SHIPPED | OUTPATIENT
Start: 2023-01-16

## 2023-01-17 NOTE — OP NOTE
Procedure Date: 01/06/2023    SURGEON:  Jeannie Kelly MD    RESIDENT:  Fortino Call MD, PGY1    SECOND ASSISTANT:  Sho Rouse PA-C (NU did 50% of case with Dr. Call assisting me on the left side).    PREOPERATIVE DIAGNOSIS:  Gender dysphoria.    POSTOPERATIVE DIAGNOSIS:  Gender dysphoria.    PROCEDURE PERFORMED:  Bilateral simple mastectomies with nipple graft reconstruction.  On-Q catheter placement.    ANESTHESIA:  GE and LMA.    ESTIMATED BLOOD LOSS:  100 mL    INTRAVENOUS FLUIDS:  1600 mL    URINE OUTPUT:  400 mL    COUNTS:  Correct.    COMPLICATIONS:  None.    DRAINS:  JAZMIN x 2.    SPECIMENS:  Right breast 309 grams, left breast 335 grams.    INDICATIONS FOR PROCEDURE:  This is a 17-year-old biological female with diagnosis of gender dysphoria.  They met WPATH and insurance criteria for gender-affirming top surgery.  Due to the patient's breast volume and ptosis, they would require double incision approach to their mastectomy.  They desired nipple graft reconstruction.    DESCRIPTION OF PROCEDURE:  The patient was seen in the preoperative waiting area.  The operative sites were marked.  This included sternal notch, sternal midline, inframammary folds, vertical line through the nipple areolar complex, medial and lateral borders of the breast footplates, palpable inferior edge of the pectoralis major on flexion, transverse line from the mid humerus.  Informed consent was obtained after reviewing possible risks and complications, including but not limited to the following:  Infection, bleeding, hematoma/seroma formation, poor healing, possible dehiscence, possible spitting sutures, possible fat necrosis, possible partial or complete loss of nipple graft, possible dehiscence, possible hypertrophic scarring, possible injury to surrounding neurovascular and musculoskeletal structures as well as intrathoracic and interaxillary structures, possible need for further surgery, possible altered sensation of  the chest wall, including hyper or hyposensitivity, possible anesthetic risks such as DVT, PE and cardiopulmonary arrest.  The patient was then brought to the operating room and placed supine on the OR table.  After general anesthesia was administered and LMA was placed, a Clark was placed.  The patient already had sequential compression devices on the lower extremities prior to induction.  Thighs and forelegs were supported on pillows and secured with padded safety straps.  Arms were secured to arm boards at 90 degrees from  OR table with Ace wraps.  A lower Jean Claude Hugger was placed and then the patient was put into a sitting position to check for symmetry and adjustments were made as necessary.  The chest breast area was then prepped and draped in the usual sterile fashion using ChloraPrep.  A timeout was taken and the proper patient and procedure were identified.  We made our incisions with a 10 blade along the IMFs marked in the preop area, with myself and Dr Call on the left side.  The PA was doing 50% of case on the right side.  After making our incision, we developed our dissection plane leaving approximately 2 cm of subcutaneous fat before beveling down to the pectoral fascia using the Bovie.  The breast mound was elevated off the pectoral fascia superiorly towards the clavicle, medially towards the parasternal border and lateral past the lateral border of the pectoralis major muscle.  This allowed us to displace the breast mound inferiorly and edson where it overlapped the IMF incision.  This was slightly lower than the preoperative markings, but allowed room for adjustments.  The nipple-areolar complex was then harvested sharply since the patient was interested in nipple grafting.  This was wrapped in normal saline gauze and kept on the back table, marked per side.  The breast mound was then amputated and then further thinning of the superior skin flap was done to achieve symmetry and also to camouflage any  abnormal underlying rib or pectoral muscle deformity.  Once we were happy with our original resection, this was passed off the back table.  Incisions were temporarily skin stapled and the patient put into a sitting position.  This allowed us to make further adjustments with regard to the level and shape of our incisions.  Of note, we had just the slightest curve of our IMF incision and we managed not to have the incisions join in the midline.  We did not have to extend the incision very far lateral for any dog ear deformities since this patient is fairly thin.  Once we were done with the additional tailoring resections to get symmetry with our incisions and placed them in an anatomical position, all the tissue resected was weighed off the back table before sending to Pathology for permanent histologic exam.  With a nice flat squared off contour and good incisions, we irrigated the dissection pocket with antibiotic saline solution.  Hemostasis was achieved with cautery.  Then #15 round JAZMIN drains were introduced through a separate stab wound incision laterally and secured with 3-0 nylon suture.  This was draped along the inferior pocket.  Dual 7.5-inch On-Q catheters were percutaneously introduced from the epigastric region and draped along the superior pocket.  These were secured with benzoin and Tegaderm.  Definitive closure was then achieved using some 2-0 Vicryl deep sutures, connective tissue and capsule layer to help offload pressure or tension on the skin closure, but also to help flatten and ensure that there was no recoil of the superior skin flap.  This was followed by 2-0 and 3-0 Vicryl deep dermal buried sutures and then 4-0 Vicryl running subcuticular suture.  Additional 5-0 plain gut or fast gut was used to touch up the skin closure.  We then turned our attention to nipple grafting.  Created nipple templates approximately 1.5-2 cm diameter were used to help localize the most aesthetic position nipple  grafts.  This was done, marked and then de-epithelialized sharply with a 15-blade.  Hemostasis was achieved with direct pressure and minimal cautery.  The grafts themselves had been aggressively thinned off the back table to facilitate graft take.  Excess areola was trimmed as needed to fit the recipient site.  The graft was then secured to the recipient site with 5-0 fast absorbing gut interrupted and running cuticular suture.  The nipple itself was defined and secured with anchoring sutures centrally.  The graft was pie crusted with an 18-gauge needle.  Bolster dressings consisting of Xeroform sheets with antibiotic-soaked cotton balls were held in place with 2-0 silk tie-overs and skin staples.  Exofin Dermabond product was applied to the incisions.  The JAZMIN tubing was then trimmed and put to bulb suction.  ABDs were used for drain sponges and a couple Kerlix rolls were unfurled across the anterior chest for padding before wrapping circumferentially with a double long 6-inch Ace wrap for chest compression.  Clark was discontinued.  The On-Q catheters were attached to the reservoir containing 550 mL of 0.2% ropivacaine to be delivered at 2 mL per hour per catheter for the next 5 days.  The patient was extubated and transferred to a stretcher and taken to the recovery room in satisfactory condition having tolerated the procedure without difficulty or complication.    Total weights as measured in the OR prior to sending to Pathology was right breast 309 grams and left breast 335 grams.    Jeannie Kelly MD        D: 01/15/2023   T: 01/15/2023   MT: [id number]    Name:     DIEGO HURD  MRN:      [id number]        Account:        [id number]   :      2005           Procedure Date: 2023     Document: [id number]

## 2023-01-20 ENCOUNTER — NURSE TRIAGE (OUTPATIENT)
Dept: NURSING | Facility: CLINIC | Age: 18
End: 2023-01-20

## 2023-01-20 NOTE — TELEPHONE ENCOUNTER
Called pt to follow up to assess. Pt states that he had bleeding from the right side drain site last night after the scab fell off. He spoke with an on-call provider who advised him to put pressure on the area with gauze and follow up with the team today. Pt did this and stated the bleeding has slowed down considerably since last night and there continues to be a small amount of bleeding today. He currently has an abd pad on the area. Pt stated that he felt somewhat faint and flushed with seeing the bleeding. Encouraged fluids and eating, which the pt stated he would do. He denies having chills or spreading redness from the surgical area. No change in pain.     Pt to follow up if any fevers, chills, spreading redness from the surgical area, or if he has an increase in bleeding to the surgical drain removal area. Pt accepting to this plan and thanked writer for following up.    Bo YUN RN

## 2023-01-20 NOTE — TELEPHONE ENCOUNTER
Nurse Triage SBAR    Is this a 2nd Level Triage?   Yes    Situation:  Blood drainage from an incision site.    Background/Assessment:     Pt reporting, a scab fell off last night and there was a lot of bleeding.   Pt soaked through several gauzes.    This morning it is a lot lighter and not as much bleeding.      It is an incision underneath the right arm pit where a drainage tube was where the scab fell off.    Pt mentioned he had a hematoma in that area.  Pt wondering if there was still drainage from the hematoma.   Or if there is something else going on.        Pt mentioned he felt wagner of faint and wondering if he should go to a walk in clinic in [facility]?    Or if he needs to be seen in clinic in the [facility].    Please call the Pt back @ [phone] for further assistance for Pt care.       Bernarda Pastrana RN  Central Triage Red Flags/Med Refills    Protocol Recommended Disposition:   See in Office Today or Tomorrow        Reason for Disposition    Patient wants to be seen    Additional Information    Negative: Major abdominal surgical incision and wound gaping open with visible internal organs    Negative: Sounds like a life-threatening emergency to the triager    Negative: Bleeding from incision and won't stop after 10 minutes of direct pressure    Negative: Bleeding (more than a few drops) from incision and after blood vessel surgery (e.g., carotidendarterectomy, femoral bypass graft, kidney dialysis fistula, tracheostomy)    Negative: Bright red, wide-spread, sunburn-like rash    Negative: SEVERE pain in the incision    Negative: Incision gaping open and < 2 days (48 hours) since wound re-opened    Negative: Incision gaping open and length of opening > 2 inches (5 cm)    Negative: Patient sounds very sick or weak to the triager    Negative: Sounds like a serious complication to the triager    Negative: Fever > 100.4 F (38.0 C)    Negative: Incision looks infected (spreading redness, pain)     Negative: Red streak runs from the incision and longer than 1 inch (2.5 cm)    Negative: Pus or bad-smelling fluid draining from incision    Negative: Dressing soaked with blood or body fluid (e.g., drainage)    Negative: Raised bruise and size > 2 inches (5 cm) and expanding    Negative: Scant bleeding (e.g., few drops) from incision and after blood vessel surgery (e.g., carotid endarterectomy, femoral bypass graft, kidney dialysis fistula    Negative: Caller has URGENT question and triager unable to answer question    Negative: Incision gaping open and length of opening > 1/4 inch (6 mm) and on the face and over 2 days since wound re-opened    Negative: Incision gaping open and length of opening > 1/2 inch (1 cm) and over 2 days since wound re-opened    Negative: Clear or blood-tinged fluid draining from wound and no fever    Negative: Suture or staple removal is overdue    Protocols used: POST-OP INCISION SYMPTOMS AND WTEOMAGOT-I-CK

## 2023-02-12 ENCOUNTER — HEALTH MAINTENANCE LETTER (OUTPATIENT)
Age: 18
End: 2023-02-12

## 2023-02-14 ENCOUNTER — OFFICE VISIT (OUTPATIENT)
Dept: PLASTIC SURGERY | Facility: CLINIC | Age: 18
End: 2023-02-14
Payer: COMMERCIAL

## 2023-02-14 VITALS
OXYGEN SATURATION: 99 % | TEMPERATURE: 97.5 F | SYSTOLIC BLOOD PRESSURE: 136 MMHG | DIASTOLIC BLOOD PRESSURE: 98 MMHG | HEART RATE: 85 BPM | HEIGHT: 65 IN | WEIGHT: 160 LBS | BODY MASS INDEX: 26.66 KG/M2

## 2023-02-14 DIAGNOSIS — Z90.13 S/P BILATERAL MASTECTOMY: Primary | ICD-10-CM

## 2023-02-14 PROCEDURE — 99024 POSTOP FOLLOW-UP VISIT: CPT | Performed by: SURGERY

## 2023-02-14 ASSESSMENT — PAIN SCALES - GENERAL: PAINLEVEL: MODERATE PAIN (5)

## 2023-02-14 NOTE — LETTER
Date:February 15, 2023      Provider requested that no letter be sent. Do not send.       [facility]

## 2023-02-14 NOTE — LETTER
"2/14/2023       RE: Virgil Calle  [address] [place] [address]     Dear Colleague,    Thank you for referring your patient, Virgil Calle, to the [facility] PLASTIC AND RECONSTRUCTIVE SURGERY CLINIC [place] at [facility]. Please see a copy of my visit note below.    PLASTICS POST-OP  This is a 18 year old trans male(he/him) with a history of gender dysphoria who presents 5 weeks post-op after a bilateral simple mastectomy with nipple graft reconstruction on 1/6/2023. He is here today by himself.    Following surgery the first week he states he had enough narcotics with a refill. The OnQ pump was helpful. Virgil uses a cane for baseline ambulation (polyarthralgia) but states that this was not a problem. He does state that the right nipple graft is \"weird-shaped.\" He also has some extra tissue at midline as well that he is concerned about.     PE: Chest: Nice upper chest contour.   Good symmetry.   Scars have slight thickening on the left side  No dog ears  No evidence of residual hematoma (right side). Bled after JAZMIN removed.   A couple tiny indentations along scar line L medial.  Right nipple has an irregular, contracted shape  Left lateral anterior axillary fullness > R side, made more apparent in contrast to adjacent thinner skin flap on L.   Left nipple is 3/4 pigmented.  Minimal pigmentation on the right nipple.  Photos taken with verbal consent.     A&P: 18 year old trans male (he/him) who presents 5 weeks post-op after a bilateral simple mastectomy with nipple graft conservation on 1/6/2023. He questions whether he can see the photo of \"the things that were cut off of me.\" These were electronically forwarded to the patient with surgical weights removed.     Overall Virgil is healing well. He can gradually increase activity as tolerated. ROM today is at 100 % without tightness. Sensation is at 80%. He would like to be cleared for swimming, and we " "recommended that he wait 6-8 weeks before doing this. Aquatherapy in [place] (rather than competitive or leisure swimming) was recommended from physical therapy.  He is able to submerge his incisions at this time. The other activity he would like to be cleared for is sleeping on his stomach.    We discussed scar reducing techniques, such as Mederma, silicone strips, vitamin E oil, emu oil, massage and when he may begin using these. For scar care, at home he has been waiting for a second opinion about which products would work best. He does have silicone strips that he has been waiting to apply.    Virgil states that he does not walk around without a shirt on at home; he states he's \"not that kind of person.\" He does state that his dysphoria is somewhat better.  And he likes his results, even with the above mentioned areas of concern.     He will follow up 6 months post-op to re-evaluate nipple graft progression. Causes for returning sooner were discussed.     Total time = 20 minutes, spent on the date of encounter doing chart review, history and physical, dressing changes, documentation, patient education, and any further activity as noted above.     This note was prepared on behalf of Jeannie Kelly MD by Mere Hirsch (they/them), a trained medical scribe, based on my observations and the provider's statements to me.             Again, thank you for allowing me to participate in the care of your patient.      Sincerely,    Jeannie Kelly MD      "

## 2023-02-14 NOTE — NURSING NOTE
"Chief Complaint   Patient presents with     TORRES Herman, is being seen today for a 6 week post-op DOS 1/6/23       Vitals:    02/14/23 0826   BP: (!) 136/98   BP Location: Left arm   Patient Position: Chair   Cuff Size: Adult Regular   Pulse: 85   Temp: 97.5  F (36.4  C)   TempSrc: Oral   SpO2: 99%   Weight: 72.6 kg (160 lb)   Height: 1.651 m (5' 5\")       Body mass index is 26.63 kg/m .      Nida Carnes LPN    "

## 2023-02-14 NOTE — PROGRESS NOTES
"PLASTICS POST-OP  This is a 18 year old trans male(tristen/him) with a history of gender dysphoria who presents 5 weeks post-op after a bilateral simple mastectomy with nipple graft reconstruction on 1/6/2023. He is here today by himself.    Following surgery the first week he states he had enough narcotics with a refill. The OnQ pump was helpful. Virgil uses a cane for baseline ambulation (polyarthralgia) but states that this was not a problem. He does state that the right nipple graft is \"weird-shaped.\" He also has some extra tissue at midline as well that he is concerned about.     PE: Chest: Nice upper chest contour.   Good symmetry.   Scars have slight thickening on the left side  No dog ears  No evidence of residual hematoma (right side). Bled after JAZMIN removed.   A couple tiny indentations along scar line L medial.  Right nipple has an irregular, contracted shape  Left lateral anterior axillary fullness > R side, made more apparent in contrast to adjacent thinner skin flap on L.   Left nipple is 3/4 pigmented.  Minimal pigmentation on the right nipple.  Photos taken with verbal consent.     A&P: 18 year old trans male (tristen/him) who presents 5 weeks post-op after a bilateral simple mastectomy with nipple graft conservation on 1/6/2023. He questions whether he can see the photo of \"the things that were cut off of me.\" These were electronically forwarded to the patient with surgical weights removed.     Overall Virgil is healing well. He can gradually increase activity as tolerated. ROM today is at 100 % without tightness. Sensation is at 80%. He would like to be cleared for swimming, and we recommended that he wait 6-8 weeks before doing this. Aquatherapy in [place] (rather than competitive or leisure swimming) was recommended from physical therapy.  He is able to submerge his incisions at this time. The other activity he would like to be cleared for is sleeping on his stomach.    We discussed scar reducing techniques, " "such as Mederma, silicone strips, vitamin E oil, emu oil, massage and when he may begin using these. For scar care, at home he has been waiting for a second opinion about which products would work best. He does have silicone strips that he has been waiting to apply.    Virgil states that he does not walk around without a shirt on at home; he states he's \"not that kind of person.\" He does state that his dysphoria is somewhat better.  And he likes his results, even with the above mentioned areas of concern.     He will follow up 6 months post-op to re-evaluate nipple graft progression. Causes for returning sooner were discussed.     Total time = 20 minutes, spent on the date of encounter doing chart review, history and physical, dressing changes, documentation, patient education, and any further activity as noted above.     This note was prepared on behalf of Jeannie Kelly MD by Mere Hirsch (they/them), a trained medical scribe, based on my observations and the provider's statements to me.         "

## 2023-03-03 ENCOUNTER — TRANSCRIBE ORDERS (OUTPATIENT)
Dept: OTHER | Age: 18
End: 2023-03-03

## 2023-03-03 DIAGNOSIS — M35.7 BENIGN JOINT HYPERMOBILITY: Primary | ICD-10-CM

## 2023-03-09 ENCOUNTER — TELEPHONE (OUTPATIENT)
Dept: CONSULT | Facility: CLINIC | Age: 18
End: 2023-03-09
Payer: COMMERCIAL

## 2023-03-09 NOTE — TELEPHONE ENCOUNTER
Referral received from Dr. Lilly Funes @ [facility] for patient to be seen in Genetics for Joint Hypermobility/EDS. Patient's chart reviewed by Genetics team--unable to schedule patient as department does not see adult patients for hypermobility/EDS. Left message for referring provider informing them of this.

## 2023-08-13 ENCOUNTER — HEALTH MAINTENANCE LETTER (OUTPATIENT)
Age: 18
End: 2023-08-13

## 2024-10-06 ENCOUNTER — HEALTH MAINTENANCE LETTER (OUTPATIENT)
Age: 19
End: 2024-10-06

## (undated) DEVICE — ESU ELEC BLADE HEX-LOCKING 2.5" E1450X

## (undated) DEVICE — SU VICRYL 3-0 FS-1 27" J442H

## (undated) DEVICE — SOL ADH LIQUID BENZOIN SWAB 0.6ML C1544

## (undated) DEVICE — PEN MARKING SKIN W/PAPER RULER 31145785

## (undated) DEVICE — STRAP KNEE/BODY 31143004

## (undated) DEVICE — DRAIN JACKSON PRATT CHANNEL 15FR ROUND HUBLESS SIL JP-2228

## (undated) DEVICE — DRSG ABDOMINAL 07 1/2X8" 7197D

## (undated) DEVICE — PREP CHLORAPREP 26ML TINTED ORANGE  260815

## (undated) DEVICE — GLOVE PROTEXIS MICRO 6.5  2D73PM65

## (undated) DEVICE — SU VICRYL 2-0 CT-2 27" J333H

## (undated) DEVICE — BNDG ELASTIC 6"X5YDS UNSTERILE 6611-60

## (undated) DEVICE — BLADE KNIFE SURG 10 371110

## (undated) DEVICE — CLOSURE SYS SKIN PREMIERPRO EXOFINFUSION 4X60CM 3473

## (undated) DEVICE — SOL NACL 0.9% IRRIG 500ML BOTTLE 2F7123

## (undated) DEVICE — RX BACITRACIN OINTMENT 14G 0.5OZ 45802-060-01

## (undated) DEVICE — DRSG KERLIX 4 1/2"X4YDS ROLL 6730

## (undated) DEVICE — SUCTION TIP YANKAUER STR K87

## (undated) DEVICE — PEN MARKING SKIN W/LABELS 31145884

## (undated) DEVICE — BAG URIMETER FOLEY KIT W/16FR FOLEY

## (undated) DEVICE — ESU PENCIL SMOKE EVAC W/ROCKER SWITCH 0703-047-000

## (undated) DEVICE — BNDG ELASTIC 6" DBL LENGTH UNSTERILE 6611-16

## (undated) DEVICE — PAD CHUX UNDERPAD 30X30"

## (undated) DEVICE — SU ETHILON 3-0 PS-1 18" 1663H

## (undated) DEVICE — DRAIN JACKSON PRATT RESERVOIR 100ML SU130-1305

## (undated) DEVICE — DRAPE LAP TRANSVERSE 29421

## (undated) DEVICE — ESU GROUND PAD ADULT W/CORD E7507

## (undated) DEVICE — SUCTION MANIFOLD NEPTUNE 2 SYS 1 PORT 702-025-000

## (undated) DEVICE — PACK MINOR CUSTOM ASC

## (undated) DEVICE — EYE MARKING PAD 581057

## (undated) DEVICE — STPL SKIN 35W APPOSE 8886803712

## (undated) DEVICE — SU VICRYL 4-0 PS-2 18" UND J496H

## (undated) DEVICE — LINEN TOWEL PACK X5 5464

## (undated) DEVICE — BLADE KNIFE SURG 15 371115

## (undated) DEVICE — SPONGE LAP 18X18" X8435

## (undated) RX ORDER — DEXMEDETOMIDINE HYDROCHLORIDE 4 UG/ML
INJECTION, SOLUTION INTRAVENOUS
Status: DISPENSED
Start: 2023-01-06

## (undated) RX ORDER — EPHEDRINE SULFATE 50 MG/ML
INJECTION, SOLUTION INTRAMUSCULAR; INTRAVENOUS; SUBCUTANEOUS
Status: DISPENSED
Start: 2023-01-06

## (undated) RX ORDER — OXYCODONE HYDROCHLORIDE 5 MG/1
TABLET ORAL
Status: DISPENSED
Start: 2023-01-06

## (undated) RX ORDER — KETOROLAC TROMETHAMINE 30 MG/ML
INJECTION, SOLUTION INTRAMUSCULAR; INTRAVENOUS
Status: DISPENSED
Start: 2023-01-06

## (undated) RX ORDER — ACETAMINOPHEN 325 MG/1
TABLET ORAL
Status: DISPENSED
Start: 2023-01-06

## (undated) RX ORDER — PROPOFOL 10 MG/ML
INJECTION, EMULSION INTRAVENOUS
Status: DISPENSED
Start: 2023-01-06

## (undated) RX ORDER — FENTANYL CITRATE 50 UG/ML
INJECTION, SOLUTION INTRAMUSCULAR; INTRAVENOUS
Status: DISPENSED
Start: 2023-01-06

## (undated) RX ORDER — DEXAMETHASONE SODIUM PHOSPHATE 4 MG/ML
INJECTION, SOLUTION INTRA-ARTICULAR; INTRALESIONAL; INTRAMUSCULAR; INTRAVENOUS; SOFT TISSUE
Status: DISPENSED
Start: 2023-01-06

## (undated) RX ORDER — SCOLOPAMINE TRANSDERMAL SYSTEM 1 MG/1
PATCH, EXTENDED RELEASE TRANSDERMAL
Status: DISPENSED
Start: 2023-01-06

## (undated) RX ORDER — HYDROMORPHONE HYDROCHLORIDE 1 MG/ML
INJECTION, SOLUTION INTRAMUSCULAR; INTRAVENOUS; SUBCUTANEOUS
Status: DISPENSED
Start: 2023-01-06

## (undated) RX ORDER — CEFAZOLIN SODIUM 1 G/3ML
INJECTION, POWDER, FOR SOLUTION INTRAMUSCULAR; INTRAVENOUS
Status: DISPENSED
Start: 2023-01-06

## (undated) RX ORDER — ONDANSETRON 2 MG/ML
INJECTION INTRAMUSCULAR; INTRAVENOUS
Status: DISPENSED
Start: 2023-01-06